# Patient Record
Sex: FEMALE | Race: WHITE | NOT HISPANIC OR LATINO | Employment: OTHER | URBAN - METROPOLITAN AREA
[De-identification: names, ages, dates, MRNs, and addresses within clinical notes are randomized per-mention and may not be internally consistent; named-entity substitution may affect disease eponyms.]

---

## 2019-01-03 ENCOUNTER — OFFICE VISIT (OUTPATIENT)
Dept: OBSTETRICS AND GYNECOLOGY | Facility: CLINIC | Age: 79
End: 2019-01-03
Payer: MEDICARE

## 2019-01-03 VITALS
WEIGHT: 237.63 LBS | HEIGHT: 61 IN | BODY MASS INDEX: 44.87 KG/M2 | DIASTOLIC BLOOD PRESSURE: 80 MMHG | SYSTOLIC BLOOD PRESSURE: 136 MMHG

## 2019-01-03 DIAGNOSIS — N81.4 CYSTOCELE WITH UTERINE PROLAPSE: Primary | ICD-10-CM

## 2019-01-03 PROCEDURE — 99203 OFFICE O/P NEW LOW 30 MIN: CPT | Mod: S$PBB,,, | Performed by: OBSTETRICS & GYNECOLOGY

## 2019-01-03 PROCEDURE — 99202 OFFICE O/P NEW SF 15 MIN: CPT | Mod: PBBFAC | Performed by: OBSTETRICS & GYNECOLOGY

## 2019-01-03 PROCEDURE — 99203 PR OFFICE/OUTPT VISIT, NEW, LEVL III, 30-44 MIN: ICD-10-PCS | Mod: S$PBB,,, | Performed by: OBSTETRICS & GYNECOLOGY

## 2019-01-03 PROCEDURE — 99999 PR PBB SHADOW E&M-NEW PATIENT-LVL II: ICD-10-PCS | Mod: PBBFAC,,, | Performed by: OBSTETRICS & GYNECOLOGY

## 2019-01-03 PROCEDURE — 99999 PR PBB SHADOW E&M-NEW PATIENT-LVL II: CPT | Mod: PBBFAC,,, | Performed by: OBSTETRICS & GYNECOLOGY

## 2019-01-03 RX ORDER — LOSARTAN POTASSIUM 50 MG/1
TABLET ORAL
Refills: 0 | COMMUNITY
Start: 2018-12-27

## 2019-01-03 RX ORDER — ASPIRIN 81 MG/1
81 TABLET ORAL DAILY
COMMUNITY

## 2019-01-03 RX ORDER — HYDROCHLOROTHIAZIDE 50 MG/1
TABLET ORAL
Refills: 5 | COMMUNITY
Start: 2018-12-17

## 2019-01-03 RX ORDER — TAMOXIFEN CITRATE 20 MG/1
TABLET ORAL
Refills: 6 | COMMUNITY
Start: 2018-12-26

## 2019-01-03 RX ORDER — MELOXICAM 15 MG/1
TABLET ORAL
Refills: 4 | COMMUNITY
Start: 2018-12-04

## 2019-01-03 RX ORDER — LEVOTHYROXINE SODIUM 150 UG/1
TABLET ORAL
Refills: 5 | COMMUNITY
Start: 2018-12-17

## 2019-01-03 RX ORDER — VERAPAMIL HYDROCHLORIDE 180 MG/1
TABLET, FILM COATED, EXTENDED RELEASE ORAL
Refills: 0 | COMMUNITY
Start: 2018-12-29

## 2019-01-03 NOTE — Clinical Note
Please fax clinic notes to Dr. Jillian Salcedo in [place], [place].  Please note on fax cover sheet that we are requesting preop clearance from Dr. Salcedo for this patient to undergo vaginal hysterectomy on January 18.

## 2019-01-04 ENCOUNTER — TELEPHONE (OUTPATIENT)
Dept: OBSTETRICS AND GYNECOLOGY | Facility: CLINIC | Age: 79
End: 2019-01-04

## 2019-01-04 NOTE — TELEPHONE ENCOUNTER
----- Message from Rosita Booker LPN sent at 1/4/2019  4:01 PM CST -----  Contact: pt   Hi!    Yesterday's visit is not charted yet, so I do not know what to do to help this patient.  Can y'all call her?    J  ----- Message -----  From: Josiah Harvey  Sent: 1/4/2019   3:48 PM  To: Aracelis Figueroa Staff    Pt is requesting a call back from the nurse in regards to the pt getting scheduled for her Procedure and to see if the Dr spoke to her PCP the pt did state  That she had other things to talk to the Dr about  [phone] (rpzr)

## 2019-01-04 NOTE — TELEPHONE ENCOUNTER
Spoke with patient. Advised patient Dr. Hsu is working on her procedure and will be in touch.  Patient verbalized understanding.

## 2019-01-07 ENCOUNTER — TELEPHONE (OUTPATIENT)
Dept: OBSTETRICS AND GYNECOLOGY | Facility: CLINIC | Age: 79
End: 2019-01-07

## 2019-01-07 DIAGNOSIS — N81.6 CYSTOCELE WITH RECTOCELE: ICD-10-CM

## 2019-01-07 DIAGNOSIS — N81.4 UTERINE PROLAPSE: ICD-10-CM

## 2019-01-07 DIAGNOSIS — Z01.818 PREOPERATIVE TESTING: Primary | ICD-10-CM

## 2019-01-07 DIAGNOSIS — N81.10 CYSTOCELE WITH RECTOCELE: ICD-10-CM

## 2019-01-10 PROBLEM — N81.4 CYSTOCELE WITH UTERINE PROLAPSE: Status: ACTIVE | Noted: 2019-01-10

## 2019-01-10 NOTE — PROGRESS NOTES
"HPI:  78 y.o. female  presents today with complaint of uterine prolapse.  She is currently wearing a gelhorn pessary, but states it comes out whenever she has a bowel movement.  Her  is able to replace it for her.  Without the pessary, her cervix protrudes through the introitus and causes pelvic pressure and pain.  She desires surgical treatment, rather than continuing to use a pessary.  She has nocturia 3-4 times per night.  No urinary incontinence, even with pessary.  History of breast cancer in past.  Using premarin vaginal cream for atrophy.  Patient lives in [place].  PCP is Dr. Jillian Salcedo, who manages her medical issues, which are hypertension and hypothyroidism.      REVIEW OF SYSTEMS:  GENERAL:  No fever, chills, fatigue, or weight loss  ABDOMEN:  Normal appetite, no weight loss or abdominal pain  URINARY:  No flank pain, dysuria, or hematuria  REPRODUCTIVE:  No abnormal vaginal bleeding  BREASTS:  No tenderness, masses, or nipple discharge noted of breasts    PHYSICAL EXAM:    APPEARANCE:  Well nourished, well developed, in no acute distress  ABDOMEN:  Soft, no tenderness or masses, no distension noted  PELVIC:  VULVA:  No lesions.  Normal female genitalia  URETHRAL MEATUS:  Normal size and location.  No lesions.  No prolapse  URETHRA:  No masses, tenderness, prolapse, or scarring  VAGINA:  No lesions or discharge.  Third degree cystocele, second degree rectocele  CERVIX:  No lesions.  Normal diameter, no cervical motion tenderness.  UTERUS:  4-6 week size, regular shape, mobile, non-tender, 4th degree prolapse noted, with cervix coming through introitus.  ADNEXA:  No masses or tenderness  ANUS AND PERINEUM:  No lesions.  No external hemorrhoids.    Pessary was removed prior to exam.  Patient fitted with a new 2 3/4" gelhorn pessary (prior one was 2" pessary), which seemed to provide good support and fit well.    ASSESSMENT:  1. Cystocele with uterine prolapse     2.      " Rectocele      PLAN:  Discussed treatment options with patient.  She desires definitive management with surgical correction.  Would recommend total vaginal hysterectomy with anterior and posterior repair, as well as sacrospinous ligament fixtion for cuff suspension.  Explained procedure to patient.  She desires to proceed with scheduling this.  Patient will need preop clearance for surgery and general anesthesia from her PCP, Dr. Salcedo.    Discussed with the patient and all questioned fully answered.

## 2019-01-11 ENCOUNTER — OFFICE VISIT (OUTPATIENT)
Dept: OBSTETRICS AND GYNECOLOGY | Facility: CLINIC | Age: 79
End: 2019-01-11
Payer: MEDICARE

## 2019-01-11 ENCOUNTER — CLINICAL SUPPORT (OUTPATIENT)
Dept: CARDIOLOGY | Facility: CLINIC | Age: 79
End: 2019-01-11
Payer: MEDICARE

## 2019-01-11 ENCOUNTER — HOSPITAL ENCOUNTER (OUTPATIENT)
Dept: PREADMISSION TESTING | Facility: HOSPITAL | Age: 79
Discharge: HOME | End: 2019-01-11
Attending: OBSTETRICS & GYNECOLOGY
Payer: MEDICARE

## 2019-01-11 ENCOUNTER — HOSPITAL ENCOUNTER (OUTPATIENT)
Dept: RADIOLOGY | Facility: HOSPITAL | Age: 79
Discharge: HOME | End: 2019-01-11
Attending: OBSTETRICS & GYNECOLOGY
Payer: MEDICARE

## 2019-01-11 VITALS
DIASTOLIC BLOOD PRESSURE: 78 MMHG | SYSTOLIC BLOOD PRESSURE: 122 MMHG | HEIGHT: 61 IN | WEIGHT: 236.56 LBS | BODY MASS INDEX: 44.66 KG/M2

## 2019-01-11 VITALS — BODY MASS INDEX: 44.4 KG/M2 | WEIGHT: 235 LBS

## 2019-01-11 DIAGNOSIS — Z01.818 PREOP EXAMINATION: Primary | ICD-10-CM

## 2019-01-11 DIAGNOSIS — Z01.818 PREOP EXAMINATION: ICD-10-CM

## 2019-01-11 DIAGNOSIS — Z01.818 PREOPERATIVE TESTING: ICD-10-CM

## 2019-01-11 PROCEDURE — 99999 PR PBB SHADOW E&M-EST. PATIENT-LVL III: ICD-10-PCS | Mod: PBBFAC,,, | Performed by: OBSTETRICS & GYNECOLOGY

## 2019-01-11 PROCEDURE — 71045 X-RAY EXAM CHEST 1 VIEW: CPT | Mod: 26,,, | Performed by: RADIOLOGY

## 2019-01-11 PROCEDURE — 99499 NO LOS: ICD-10-PCS | Mod: S$PBB,,, | Performed by: OBSTETRICS & GYNECOLOGY

## 2019-01-11 PROCEDURE — 99213 OFFICE O/P EST LOW 20 MIN: CPT | Mod: PBBFAC,25 | Performed by: OBSTETRICS & GYNECOLOGY

## 2019-01-11 PROCEDURE — 93005 ELECTROCARDIOGRAM TRACING: CPT | Mod: PBBFAC | Performed by: INTERNAL MEDICINE

## 2019-01-11 PROCEDURE — 93010 EKG 12-LEAD: ICD-10-PCS | Mod: S$PBB,,, | Performed by: INTERNAL MEDICINE

## 2019-01-11 PROCEDURE — 99499 UNLISTED E&M SERVICE: CPT | Mod: S$PBB,,, | Performed by: OBSTETRICS & GYNECOLOGY

## 2019-01-11 PROCEDURE — 93010 ELECTROCARDIOGRAM REPORT: CPT | Mod: S$PBB,,, | Performed by: INTERNAL MEDICINE

## 2019-01-11 PROCEDURE — 71045 X-RAY EXAM CHEST 1 VIEW: CPT | Mod: TC

## 2019-01-11 PROCEDURE — 71045 XR CHEST 1 VIEW PRE-OP: ICD-10-PCS | Mod: 26,,, | Performed by: RADIOLOGY

## 2019-01-11 PROCEDURE — 99999 PR PBB SHADOW E&M-EST. PATIENT-LVL III: CPT | Mod: PBBFAC,,, | Performed by: OBSTETRICS & GYNECOLOGY

## 2019-01-11 NOTE — DISCHARGE INSTRUCTIONS
To confirm, Your doctor has instructed you that surgery is scheduled for 1/18/19  at  07:00 am.       Please report to [facility], [address], 1st floor, main lobby by 05:30am.    Pre admit office will call afternoon prior to surgery with final arrival time    INSTRUCTIONS IMPORTANT!!!   Do not eat, drink, or smoke after 12 midnight-including water. OK to brush teeth, no gum, candy or mints!    ¨ Take only these medicines with a small swallow of water-morning of surgery.  Synthroid, Losartan, Verapamil  Hold Aspirin and Mobic as of today  Stop all vitamins and supplements      Pre operative instructions:  Please review the Pre-Operative Instruction booklet that you were given.        Bathing Instructions--See page 6 in the Pre-operative booklet.      Prevention of surgical site infections:     -Keep incisions clean and dry.   -Do not soak/submerge incisions in water until completely healed.   -Do not apply lotions, powders, creams, or deodorants to site.   -Always make sure hands are cleaned with antibacterial soap/ alcohol-based                 prior to touching the surgical site.  (This includes doctors,                 nurses, staff, and yourself.)    Signs and symptoms:   -Redness and pain around the area where you had surgery   -Drainage of cloudy fluid from your surgical wound   -Fever over 100.4       I have read or had read and explained to me, and understand the above information.  Additional comments or instructions:  Received a copy of Pre-operative instructions booklet, FAQ surgical site infection sheet, and packets of hibiclens (if indicated).

## 2019-01-12 NOTE — PROGRESS NOTES
Patient presents today for preoperative visit.  She is scheduled for TVH, A&P repair, and SSLF on 1/18/19.  Patient states the pessary placed last week has stayed in place well and has caused no issues.  Discussed with her the option of continuing with that pessary as an alternative to surgery.  However, patient doesn't want to have to deal with the recurring visits and care required with a pessary and desires to proceed with surgery.  Procedure was explained to the patient and procedure specific consent form reviewed with the patient and signed by her.  All questions were answered to the patient's satisfaction.  Patient seems to understand the procedure, as well as risks and benefits associated with it.  Will plan to proceed with surgery as planned.

## 2019-01-14 ENCOUNTER — TELEPHONE (OUTPATIENT)
Dept: OBSTETRICS AND GYNECOLOGY | Facility: HOSPITAL | Age: 79
End: 2019-01-14

## 2019-01-14 NOTE — TELEPHONE ENCOUNTER
Patient is scheduled for surgery on 1/18/19.  She needs preop clearance by her PCP, Dr. Jillian Salcedo, in [place].  Please fax patient's lab, EKG, and chest x-ray results to Dr. Salcedo.  Fax number is [phone]

## 2019-01-15 ENCOUNTER — TELEPHONE (OUTPATIENT)
Dept: OBSTETRICS AND GYNECOLOGY | Facility: CLINIC | Age: 79
End: 2019-01-15

## 2019-01-15 NOTE — TELEPHONE ENCOUNTER
Spoke to patient. Patient stated that PCP can do EKG. Advised patient that is fine and she does not need to come to EKG appointment here as long as PCP will fax us a copy of it tomorrow. Advised patient of office fax number. Patient verbalized understanding.     EKG was found per Judy ROSSI Patient will not need another EKG.

## 2019-01-15 NOTE — TELEPHONE ENCOUNTER
----- Message from Jackeline Mueller sent at 1/15/2019  4:41 PM CST -----  Contact: Pt   Pt called and stated she needs to know if she needs to have an order for a mouth swab test to check for staph infection. She can be reached at [phone].    Thanks,  TF

## 2019-01-15 NOTE — TELEPHONE ENCOUNTER
Advised  that a mouth swab is not required prior to surgery.  Patient is seeing PCP tomorrow and he will check with them to see if they require that test before approving surgery.

## 2019-01-15 NOTE — TELEPHONE ENCOUNTER
Spoke tot he pt.'s tristanand and gave him the message below from Judy. yancy Zhang    Spoke to patient. Patient stated that PCP can do EKG. Advised patient that is fine and she does not need to come to EKG appointment here as long as PCP will fax us a copy of it tomorrow. Advised patient of office fax number. Patient verbalized understanding.      EKG was found per Judy ROSSI Patient will not need another EKG.

## 2019-01-18 ENCOUNTER — ANESTHESIA EVENT (OUTPATIENT)
Dept: SURGERY | Facility: HOSPITAL | Age: 79
DRG: 743 | End: 2019-01-18
Payer: MEDICARE

## 2019-01-18 ENCOUNTER — HOSPITAL ENCOUNTER (INPATIENT)
Facility: HOSPITAL | Age: 79
LOS: 1 days | Discharge: HOME | DRG: 743 | End: 2019-01-19
Attending: OBSTETRICS & GYNECOLOGY | Admitting: OBSTETRICS & GYNECOLOGY
Payer: MEDICARE

## 2019-01-18 ENCOUNTER — ANESTHESIA (OUTPATIENT)
Dept: SURGERY | Facility: HOSPITAL | Age: 79
DRG: 743 | End: 2019-01-18
Payer: MEDICARE

## 2019-01-18 DIAGNOSIS — N81.4 CYSTOCELE WITH UTERINE PROLAPSE: ICD-10-CM

## 2019-01-18 DIAGNOSIS — Z90.710 S/P VAGINAL HYSTERECTOMY: Primary | ICD-10-CM

## 2019-01-18 PROBLEM — I10 ESSENTIAL HYPERTENSION: Status: ACTIVE | Noted: 2019-01-18

## 2019-01-18 PROBLEM — E03.9 HYPOTHYROIDISM (ACQUIRED): Status: ACTIVE | Noted: 2019-01-18

## 2019-01-18 PROBLEM — Z85.3 HISTORY OF BREAST CANCER: Status: ACTIVE | Noted: 2019-01-18

## 2019-01-18 LAB — POCT GLUCOSE: 127 MG/DL (ref 70–110)

## 2019-01-18 PROCEDURE — 36000708 HC OR TIME LEV III 1ST 15 MIN: Performed by: OBSTETRICS & GYNECOLOGY

## 2019-01-18 PROCEDURE — 58260 PR VAGINAL HYSTERECTOMY,UTERUS 250 GMS/<: ICD-10-PCS | Mod: 80,51,, | Performed by: OBSTETRICS & GYNECOLOGY

## 2019-01-18 PROCEDURE — 97116 GAIT TRAINING THERAPY: CPT

## 2019-01-18 PROCEDURE — 57282 COLPOPEXY EXTRAPERITONEAL: CPT | Mod: 80,59,, | Performed by: OBSTETRICS & GYNECOLOGY

## 2019-01-18 PROCEDURE — C2631 REP DEV, URINARY, W/O SLING: HCPCS | Performed by: OBSTETRICS & GYNECOLOGY

## 2019-01-18 PROCEDURE — 88305 TISSUE SPECIMEN TO PATHOLOGY - SURGERY: ICD-10-PCS | Mod: 26,,, | Performed by: PATHOLOGY

## 2019-01-18 PROCEDURE — 97161 PT EVAL LOW COMPLEX 20 MIN: CPT

## 2019-01-18 PROCEDURE — 37000008 HC ANESTHESIA 1ST 15 MINUTES: Performed by: OBSTETRICS & GYNECOLOGY

## 2019-01-18 PROCEDURE — 25000003 PHARM REV CODE 250: Performed by: OBSTETRICS & GYNECOLOGY

## 2019-01-18 PROCEDURE — 37000009 HC ANESTHESIA EA ADD 15 MINS: Performed by: OBSTETRICS & GYNECOLOGY

## 2019-01-18 PROCEDURE — 57282 PR REVAGINAL PROLAPSE,SACROSP LIG: ICD-10-PCS | Mod: 59,,, | Performed by: OBSTETRICS & GYNECOLOGY

## 2019-01-18 PROCEDURE — 57282 COLPOPEXY EXTRAPERITONEAL: CPT | Mod: 59,,, | Performed by: OBSTETRICS & GYNECOLOGY

## 2019-01-18 PROCEDURE — 88305 TISSUE EXAM BY PATHOLOGIST: CPT | Mod: 26,,, | Performed by: PATHOLOGY

## 2019-01-18 PROCEDURE — 25000003 PHARM REV CODE 250: Performed by: NURSE ANESTHETIST, CERTIFIED REGISTERED

## 2019-01-18 PROCEDURE — 88305 TISSUE EXAM BY PATHOLOGIST: CPT | Performed by: PATHOLOGY

## 2019-01-18 PROCEDURE — 57250 PR POST COLPORRHAPHY,RECTUM/VAGINA: ICD-10-PCS | Mod: ,,, | Performed by: OBSTETRICS & GYNECOLOGY

## 2019-01-18 PROCEDURE — 58260 PR VAGINAL HYSTERECTOMY,UTERUS 250 GMS/<: ICD-10-PCS | Mod: 51,,, | Performed by: OBSTETRICS & GYNECOLOGY

## 2019-01-18 PROCEDURE — 36000709 HC OR TIME LEV III EA ADD 15 MIN: Performed by: OBSTETRICS & GYNECOLOGY

## 2019-01-18 PROCEDURE — 11000001 HC ACUTE MED/SURG PRIVATE ROOM

## 2019-01-18 PROCEDURE — 57282 PR REVAGINAL PROLAPSE,SACROSP LIG: ICD-10-PCS | Mod: 80,59,, | Performed by: OBSTETRICS & GYNECOLOGY

## 2019-01-18 PROCEDURE — 57250 REPAIR RECTUM & VAGINA: CPT | Mod: 80,,, | Performed by: OBSTETRICS & GYNECOLOGY

## 2019-01-18 PROCEDURE — 63600175 PHARM REV CODE 636 W HCPCS: Performed by: OBSTETRICS & GYNECOLOGY

## 2019-01-18 PROCEDURE — 58260 VAGINAL HYSTERECTOMY: CPT | Mod: 51,,, | Performed by: OBSTETRICS & GYNECOLOGY

## 2019-01-18 PROCEDURE — 58260 VAGINAL HYSTERECTOMY: CPT | Mod: 80,51,, | Performed by: OBSTETRICS & GYNECOLOGY

## 2019-01-18 PROCEDURE — 27000221 HC OXYGEN, UP TO 24 HOURS

## 2019-01-18 PROCEDURE — 63600175 PHARM REV CODE 636 W HCPCS: Performed by: ANESTHESIOLOGY

## 2019-01-18 PROCEDURE — 57250 REPAIR RECTUM & VAGINA: CPT | Mod: ,,, | Performed by: OBSTETRICS & GYNECOLOGY

## 2019-01-18 PROCEDURE — 71000033 HC RECOVERY, INTIAL HOUR: Performed by: OBSTETRICS & GYNECOLOGY

## 2019-01-18 PROCEDURE — 57250 PR POST COLPORRHAPHY,RECTUM/VAGINA: ICD-10-PCS | Mod: 80,,, | Performed by: OBSTETRICS & GYNECOLOGY

## 2019-01-18 PROCEDURE — 94799 UNLISTED PULMONARY SVC/PX: CPT

## 2019-01-18 PROCEDURE — 63600175 PHARM REV CODE 636 W HCPCS: Performed by: NURSE ANESTHETIST, CERTIFIED REGISTERED

## 2019-01-18 PROCEDURE — 94761 N-INVAS EAR/PLS OXIMETRY MLT: CPT

## 2019-01-18 RX ORDER — LOSARTAN POTASSIUM 50 MG/1
50 TABLET ORAL DAILY
Status: DISCONTINUED | OUTPATIENT
Start: 2019-01-19 | End: 2019-01-19 | Stop reason: HOSPADM

## 2019-01-18 RX ORDER — VERAPAMIL HYDROCHLORIDE 180 MG/1
180 TABLET, FILM COATED, EXTENDED RELEASE ORAL DAILY
Status: DISCONTINUED | OUTPATIENT
Start: 2019-01-19 | End: 2019-01-19

## 2019-01-18 RX ORDER — DIPHENHYDRAMINE HYDROCHLORIDE 50 MG/ML
25 INJECTION INTRAMUSCULAR; INTRAVENOUS EVERY 6 HOURS PRN
Status: DISCONTINUED | OUTPATIENT
Start: 2019-01-18 | End: 2019-01-18

## 2019-01-18 RX ORDER — CEFAZOLIN SODIUM 1 G/3ML
INJECTION, POWDER, FOR SOLUTION INTRAMUSCULAR; INTRAVENOUS
Status: DISCONTINUED | OUTPATIENT
Start: 2019-01-18 | End: 2019-01-18

## 2019-01-18 RX ORDER — HYDROMORPHONE HYDROCHLORIDE 2 MG/ML
0.2 INJECTION, SOLUTION INTRAMUSCULAR; INTRAVENOUS; SUBCUTANEOUS EVERY 5 MIN PRN
Status: DISCONTINUED | OUTPATIENT
Start: 2019-01-18 | End: 2019-01-18 | Stop reason: HOSPADM

## 2019-01-18 RX ORDER — IBUPROFEN 400 MG/1
800 TABLET ORAL EVERY 8 HOURS
Status: DISCONTINUED | OUTPATIENT
Start: 2019-01-19 | End: 2019-01-19 | Stop reason: HOSPADM

## 2019-01-18 RX ORDER — ONDANSETRON 2 MG/ML
INJECTION INTRAMUSCULAR; INTRAVENOUS
Status: DISCONTINUED | OUTPATIENT
Start: 2019-01-18 | End: 2019-01-18

## 2019-01-18 RX ORDER — LIDOCAINE HYDROCHLORIDE 10 MG/ML
INJECTION INFILTRATION; PERINEURAL
Status: DISCONTINUED | OUTPATIENT
Start: 2019-01-18 | End: 2019-01-18

## 2019-01-18 RX ORDER — ROCURONIUM BROMIDE 10 MG/ML
INJECTION, SOLUTION INTRAVENOUS
Status: DISCONTINUED | OUTPATIENT
Start: 2019-01-18 | End: 2019-01-18

## 2019-01-18 RX ORDER — MEPERIDINE HYDROCHLORIDE 50 MG/ML
12.5 INJECTION INTRAMUSCULAR; INTRAVENOUS; SUBCUTANEOUS ONCE AS NEEDED
Status: DISCONTINUED | OUTPATIENT
Start: 2019-01-18 | End: 2019-01-18 | Stop reason: HOSPADM

## 2019-01-18 RX ORDER — SIMETHICONE 80 MG
80 TABLET,CHEWABLE ORAL EVERY 4 HOURS PRN
Status: DISCONTINUED | OUTPATIENT
Start: 2019-01-18 | End: 2019-01-19 | Stop reason: HOSPADM

## 2019-01-18 RX ORDER — DIPHENHYDRAMINE HCL 25 MG
25 CAPSULE ORAL EVERY 4 HOURS PRN
Status: DISCONTINUED | OUTPATIENT
Start: 2019-01-18 | End: 2019-01-19 | Stop reason: HOSPADM

## 2019-01-18 RX ORDER — ETOMIDATE 2 MG/ML
INJECTION INTRAVENOUS
Status: DISCONTINUED | OUTPATIENT
Start: 2019-01-18 | End: 2019-01-18

## 2019-01-18 RX ORDER — SUCCINYLCHOLINE CHLORIDE 20 MG/ML
INJECTION INTRAMUSCULAR; INTRAVENOUS
Status: DISCONTINUED | OUTPATIENT
Start: 2019-01-18 | End: 2019-01-18

## 2019-01-18 RX ORDER — VASOPRESSIN 20 [USP'U]/ML
INJECTION, SOLUTION INTRAMUSCULAR; SUBCUTANEOUS
Status: DISCONTINUED | OUTPATIENT
Start: 2019-01-18 | End: 2019-01-18 | Stop reason: HOSPADM

## 2019-01-18 RX ORDER — KETOROLAC TROMETHAMINE 30 MG/ML
INJECTION, SOLUTION INTRAMUSCULAR; INTRAVENOUS
Status: DISCONTINUED | OUTPATIENT
Start: 2019-01-18 | End: 2019-01-18

## 2019-01-18 RX ORDER — OXYCODONE AND ACETAMINOPHEN 5; 325 MG/1; MG/1
1 TABLET ORAL EVERY 4 HOURS PRN
Status: DISCONTINUED | OUTPATIENT
Start: 2019-01-18 | End: 2019-01-19

## 2019-01-18 RX ORDER — SODIUM CHLORIDE, SODIUM LACTATE, POTASSIUM CHLORIDE, CALCIUM CHLORIDE 600; 310; 30; 20 MG/100ML; MG/100ML; MG/100ML; MG/100ML
INJECTION, SOLUTION INTRAVENOUS CONTINUOUS
Status: DISCONTINUED | OUTPATIENT
Start: 2019-01-18 | End: 2019-01-18

## 2019-01-18 RX ORDER — NEOSTIGMINE METHYLSULFATE 1 MG/ML
INJECTION, SOLUTION INTRAVENOUS
Status: DISCONTINUED | OUTPATIENT
Start: 2019-01-18 | End: 2019-01-18

## 2019-01-18 RX ORDER — PHENAZOPYRIDINE HYDROCHLORIDE 100 MG/1
200 TABLET, FILM COATED ORAL
Status: DISCONTINUED | OUTPATIENT
Start: 2019-01-18 | End: 2019-01-18 | Stop reason: HOSPADM

## 2019-01-18 RX ORDER — ONDANSETRON 2 MG/ML
4 INJECTION INTRAMUSCULAR; INTRAVENOUS DAILY PRN
Status: DISCONTINUED | OUTPATIENT
Start: 2019-01-18 | End: 2019-01-18 | Stop reason: HOSPADM

## 2019-01-18 RX ORDER — FENTANYL CITRATE 50 UG/ML
25 INJECTION, SOLUTION INTRAMUSCULAR; INTRAVENOUS EVERY 5 MIN PRN
Status: DISCONTINUED | OUTPATIENT
Start: 2019-01-18 | End: 2019-01-18 | Stop reason: HOSPADM

## 2019-01-18 RX ORDER — HYDROCHLOROTHIAZIDE 25 MG/1
50 TABLET ORAL DAILY
Status: DISCONTINUED | OUTPATIENT
Start: 2019-01-19 | End: 2019-01-19 | Stop reason: HOSPADM

## 2019-01-18 RX ORDER — ONDANSETRON 8 MG/1
8 TABLET, ORALLY DISINTEGRATING ORAL EVERY 8 HOURS PRN
Status: DISCONTINUED | OUTPATIENT
Start: 2019-01-18 | End: 2019-01-19 | Stop reason: HOSPADM

## 2019-01-18 RX ORDER — HYDROMORPHONE HYDROCHLORIDE 1 MG/ML
1 INJECTION, SOLUTION INTRAMUSCULAR; INTRAVENOUS; SUBCUTANEOUS EVERY 4 HOURS PRN
Status: DISCONTINUED | OUTPATIENT
Start: 2019-01-18 | End: 2019-01-19 | Stop reason: HOSPADM

## 2019-01-18 RX ORDER — OXYCODONE AND ACETAMINOPHEN 10; 325 MG/1; MG/1
1 TABLET ORAL EVERY 4 HOURS PRN
Status: DISCONTINUED | OUTPATIENT
Start: 2019-01-18 | End: 2019-01-19

## 2019-01-18 RX ORDER — ASPIRIN 81 MG/1
81 TABLET ORAL DAILY
Status: DISCONTINUED | OUTPATIENT
Start: 2019-01-18 | End: 2019-01-19 | Stop reason: HOSPADM

## 2019-01-18 RX ORDER — KETOROLAC TROMETHAMINE 30 MG/ML
15 INJECTION, SOLUTION INTRAMUSCULAR; INTRAVENOUS EVERY 6 HOURS
Status: COMPLETED | OUTPATIENT
Start: 2019-01-18 | End: 2019-01-19

## 2019-01-18 RX ORDER — HYDROCODONE BITARTRATE AND ACETAMINOPHEN 5; 325 MG/1; MG/1
1 TABLET ORAL
Status: DISCONTINUED | OUTPATIENT
Start: 2019-01-18 | End: 2019-01-18 | Stop reason: HOSPADM

## 2019-01-18 RX ORDER — PROPOFOL 10 MG/ML
VIAL (ML) INTRAVENOUS
Status: DISCONTINUED | OUTPATIENT
Start: 2019-01-18 | End: 2019-01-18

## 2019-01-18 RX ORDER — SODIUM CHLORIDE, SODIUM LACTATE, POTASSIUM CHLORIDE, CALCIUM CHLORIDE 600; 310; 30; 20 MG/100ML; MG/100ML; MG/100ML; MG/100ML
INJECTION, SOLUTION INTRAVENOUS CONTINUOUS PRN
Status: DISCONTINUED | OUTPATIENT
Start: 2019-01-18 | End: 2019-01-18

## 2019-01-18 RX ORDER — FENTANYL CITRATE 50 UG/ML
INJECTION, SOLUTION INTRAMUSCULAR; INTRAVENOUS
Status: DISCONTINUED | OUTPATIENT
Start: 2019-01-18 | End: 2019-01-18

## 2019-01-18 RX ORDER — SODIUM CHLORIDE, SODIUM LACTATE, POTASSIUM CHLORIDE, CALCIUM CHLORIDE 600; 310; 30; 20 MG/100ML; MG/100ML; MG/100ML; MG/100ML
INJECTION, SOLUTION INTRAVENOUS CONTINUOUS
Status: DISCONTINUED | OUTPATIENT
Start: 2019-01-18 | End: 2019-01-19

## 2019-01-18 RX ORDER — LEVOTHYROXINE SODIUM 150 UG/1
150 TABLET ORAL
Status: DISCONTINUED | OUTPATIENT
Start: 2019-01-19 | End: 2019-01-19 | Stop reason: HOSPADM

## 2019-01-18 RX ORDER — GLYCOPYRROLATE 0.2 MG/ML
INJECTION INTRAMUSCULAR; INTRAVENOUS
Status: DISCONTINUED | OUTPATIENT
Start: 2019-01-18 | End: 2019-01-18

## 2019-01-18 RX ADMIN — HYDROMORPHONE HYDROCHLORIDE 0.2 MG: 2 INJECTION INTRAMUSCULAR; INTRAVENOUS; SUBCUTANEOUS at 09:01

## 2019-01-18 RX ADMIN — SUCCINYLCHOLINE CHLORIDE 100 MG: 20 INJECTION, SOLUTION INTRAMUSCULAR; INTRAVENOUS at 07:01

## 2019-01-18 RX ADMIN — SODIUM CHLORIDE, SODIUM LACTATE, POTASSIUM CHLORIDE, AND CALCIUM CHLORIDE: .6; .31; .03; .02 INJECTION, SOLUTION INTRAVENOUS at 08:01

## 2019-01-18 RX ADMIN — ONDANSETRON 4 MG: 2 INJECTION INTRAMUSCULAR; INTRAVENOUS at 09:01

## 2019-01-18 RX ADMIN — LIDOCAINE HYDROCHLORIDE 40 MG: 10 INJECTION, SOLUTION INFILTRATION; PERINEURAL at 07:01

## 2019-01-18 RX ADMIN — SODIUM CHLORIDE, SODIUM LACTATE, POTASSIUM CHLORIDE, AND CALCIUM CHLORIDE: .6; .31; .03; .02 INJECTION, SOLUTION INTRAVENOUS at 11:01

## 2019-01-18 RX ADMIN — NEOSTIGMINE METHYLSULFATE 1 MG: 1 INJECTION INTRAVENOUS at 08:01

## 2019-01-18 RX ADMIN — FENTANYL CITRATE 50 MCG: 50 INJECTION, SOLUTION INTRAMUSCULAR; INTRAVENOUS at 07:01

## 2019-01-18 RX ADMIN — KETOROLAC TROMETHAMINE 15 MG: 30 INJECTION, SOLUTION INTRAMUSCULAR; INTRAVENOUS at 05:01

## 2019-01-18 RX ADMIN — KETOROLAC TROMETHAMINE 15 MG: 30 INJECTION, SOLUTION INTRAMUSCULAR; INTRAVENOUS at 01:01

## 2019-01-18 RX ADMIN — KETOROLAC TROMETHAMINE 15 MG: 30 INJECTION, SOLUTION INTRAMUSCULAR; INTRAVENOUS at 11:01

## 2019-01-18 RX ADMIN — ROCURONIUM BROMIDE 25 MG: 10 INJECTION, SOLUTION INTRAVENOUS at 07:01

## 2019-01-18 RX ADMIN — ROBINUL 0.2 MG: 0.2 INJECTION INTRAMUSCULAR; INTRAVENOUS at 08:01

## 2019-01-18 RX ADMIN — ROCURONIUM BROMIDE 5 MG: 10 INJECTION, SOLUTION INTRAVENOUS at 07:01

## 2019-01-18 RX ADMIN — ETOMIDATE 18 MG: 2 INJECTION, SOLUTION INTRAVENOUS at 07:01

## 2019-01-18 RX ADMIN — KETOROLAC TROMETHAMINE 15 MG: 30 INJECTION, SOLUTION INTRAMUSCULAR; INTRAVENOUS at 08:01

## 2019-01-18 RX ADMIN — CEFAZOLIN 2 G: 1 INJECTION, POWDER, FOR SOLUTION INTRAMUSCULAR; INTRAVENOUS at 07:01

## 2019-01-18 RX ADMIN — ONDANSETRON 4 MG: 2 INJECTION, SOLUTION INTRAMUSCULAR; INTRAVENOUS at 07:01

## 2019-01-18 RX ADMIN — ASPIRIN 81 MG: 81 TABLET, COATED ORAL at 11:01

## 2019-01-18 RX ADMIN — PROPOFOL 50 MG: 10 INJECTION, EMULSION INTRAVENOUS at 07:01

## 2019-01-18 RX ADMIN — SODIUM CHLORIDE, SODIUM LACTATE, POTASSIUM CHLORIDE, AND CALCIUM CHLORIDE: 600; 310; 30; 20 INJECTION, SOLUTION INTRAVENOUS at 07:01

## 2019-01-18 RX ADMIN — FENTANYL CITRATE 50 MCG: 50 INJECTION, SOLUTION INTRAMUSCULAR; INTRAVENOUS at 08:01

## 2019-01-18 NOTE — ANESTHESIA POSTPROCEDURE EVALUATION
"Anesthesia Post Evaluation    Patient: Shirin Cantu    Procedure(s) Performed: Procedure(s) (LRB):  HYSTERECTOMY, TOTAL, VAGINAL (N/A)  COLPORRHAPHY, POSTERIOR    Final Anesthesia Type: general  Patient location during evaluation: PACU  Patient participation: Yes- Able to Participate  Level of consciousness: awake and alert  Post-procedure vital signs: reviewed and stable  Pain management: adequate  Airway patency: patent  PONV status at discharge: No PONV  Anesthetic complications: no      Cardiovascular status: blood pressure returned to baseline  Respiratory status: unassisted and spontaneous ventilation  Hydration status: euvolemic  Follow-up not needed.        Visit Vitals  /60 (BP Location: Right arm, Patient Position: Lying)   Pulse 65   Temp 36.9 °C (98.5 °F) (Oral)   Resp 17   Ht 5' 2" (1.575 m)   Wt 104.8 kg (231 lb)   SpO2 (!) 92%   Breastfeeding? No   BMI 42.25 kg/m²       Pain/Amara Score: Pain Rating Prior to Med Admin: 5 (1/18/2019  9:47 AM)  Pain Rating Post Med Admin: 5 (1/18/2019 10:08 AM)  Amara Score: 9 (1/18/2019  9:45 AM)        "

## 2019-01-18 NOTE — HPI
78 y.o. female  with symptomatic uterine prolapse.  Also with difficulty emptying bladder.  No urinary leakage.  Has been wearing pessary for past month.  Patient desires TVH, A&P repair, with SSLF.

## 2019-01-18 NOTE — PT/OT/SLP EVAL
Physical Therapy Evaluation    Patient Name:  Shirin Cantu   MRN:  [id number]    Recommendations:     Discharge Recommendations:  other (see comments)(HOME HEALTH P.T.)   Discharge Equipment Recommendations: none   Barriers to discharge: None    Assessment:     Shirin Cantu is a 78 y.o. female admitted with a medical diagnosis of <principal problem not specified>.  She presents with the following impairments/functional limitations:  weakness, impaired endurance, impaired functional mobilty, impaired balance, gait instability, impaired self care skills .    Rehab Prognosis: Good; patient would benefit from acute skilled PT services to address these deficits and reach maximum level of function.    Recent Surgery: Procedure(s) (LRB):  HYSTERECTOMY, TOTAL, VAGINAL (N/A)  COLPORRHAPHY, POSTERIOR Day of Surgery    Plan:     During this hospitalization, patient to be seen   to address the identified rehab impairments via gait training, therapeutic activities, therapeutic exercises and progress toward the following goals:    · Plan of Care Expires:  01/25/19    Subjective     Chief Complaint: MERINO CATHETER  Patient/Family Comments/goals: GO HOME  Pain/Comfort:  · Pain Rating 1: 0/10  · Pain Rating Post-Intervention 1: 0/10    Patients cultural, spiritual, Quaker conflicts given the current situation:      Living Environment:  PT LIVES AT HOME WITH  AND HAS 6 STEPS WITH RAILING TO HOLD ONTO. PT LIVES IN A ONE STORY HOME   Prior to admission, patients level of function was MOD I WITH RW IN COMMUNITY AND AMBULATES IND INSIDE HOME.  Equipment used at home: walker, rolling.  DME owned (not currently used): none.  Upon discharge, patient will have assistance from .    Objective:     Communicated with NURSE WILKES AND Epic CHART REVIEW prior to session.  Patient found SUP IN BED WITH  PRESENT peripheral IV, merino catheter  upon PT entry to room.    General Precautions: Standard, fall   Orthopedic  Precautions:N/A   Braces: N/A     Exams:  · RLE ROM: WFL  · RLE Strength: WFL  · LLE ROM: WFL  · LLE Strength: WFL    Functional Mobility:  PT MET IN RM SUP.SIT EOB WITH MIN A. PT SCOOTED TO EOB WITH MIN A. PT STOOD WITH RW AND GT TRAINED X 150' WITH RW AND CGA. PT RETURNED TO RM SEATED EOB AND SUP IN BED WITH CGA. PT LEFT SUP IN BED AND EDUCATED ON ROLE OF P.T. PT LEFT WITH ALL NEEDS MET    AM-PAC 6 CLICK MOBILITY  Total Score:16     Patient left supine with call button in reach.    GOALS:   Multidisciplinary Problems     Physical Therapy Goals        Problem: Physical Therapy Goal    Goal Priority Disciplines Outcome Goal Variances Interventions   Physical Therapy Goal     PT, PT/OT      Description:  PT WILL BE SEEN FOR P.T. FOR A MIN OF 5 OUT OF 7 DAYS A WEEK  LT19  1. PT WILL COMPLETE BED MOBILITY MOD I  2. PT WILL T/F TO CHAIR WITH RW MOD I  3. PT WILL GT TRAIN X 250' MOD I WITH RW                      History:     Past Medical History:   Diagnosis Date    Breast cancer     Hypertension     Thyroid disease     s/p radioactive iodine treatment, now with hypothyroidism       Past Surgical History:   Procedure Laterality Date    BREAST LUMPECTOMY Left     SHOULDER SURGERY Right     TONSILLECTOMY, ADENOIDECTOMY      TOTAL KNEE ARTHROPLASTY Right        Clinical Decision Making:     History  Co-morbidities and personal factors that may impact the plan of care Examination  Body Structures and Functions, activity limitations and participation restrictions that may impact the plan of care Clinical Presentation   Decision Making/ Complexity Score   Co-morbidities:   [] Time since onset of injury / illness / exacerbation  [] Status of current condition  []Patient's cognitive status and safety concerns    [] Multiple Medical Problems (see med hx)  Personal Factors:   [] Patient's age  [] Prior Level of function   [] Patient's home situation (environment and family support)  [] Patient's level of  motivation  [] Expected progression of patient      HISTORY:(criteria)    [] 17983 - no personal factors/history    [] 92781 - has 1-2 personal factor/comorbidity     [] 63823 - has >3 personal factor/comorbidity     Body Regions:  [] Objective examination findings  [] Head     []  Neck  [] Trunk   [] Upper Extremity  [] Lower Extremity    Body Systems:  [] For communication ability, affect, cognition, language, and learning style: the assessment of the ability to make needs known, consciousness, orientation (person, place, and time), expected emotional /behavioral responses, and learning preferences (eg, learning barriers, education  needs)  [] For the neuromuscular system: a general assessment of gross coordinated movement (eg, balance, gait, locomotion, transfers, and transitions) and motor function  (motor control and motor learning)  [] For the musculoskeletal system: the assessment of gross symmetry, gross range of motion, gross strength, height, and weight  [] For the integumentary system: the assessment of pliability(texture), presence of scar formation, skin color, and skin integrity  [] For cardiovascular/pulmonary system: the assessment of heart rate, respiratory rate, blood pressure, and edema     Activity limitations:    [] Patient's cognitive status and saf ety concerns          [] Status of current condition      [] Weight bearing restriction  [] Cardiopulmunary Restriction    Participation Restrictions:   [] Goals and goal agreement with the patient     [] Rehab potential (prognosis) and probable outcome      Examination of Body System: (criteria)    [] 41327 - addressing 1-2 elements    [] 83490 - addressing a total of 3 or more elements     [] 08872 -  Addressing a total of 4 or more elements         Clinical Presentation: (criteria)  Choose one     On examination of body system using standardized tests and measures patient presents with (CHOOSE ONE) elements from any of the following: body  structures and functions, activity limitations, and/or participation restrictions.  Leading to a clinical presentation that is considered (CHOOSE ONE)                              Clinical Decision Making  (Eval Complexity):  Choose One     Time Tracking:     PT Received On: 01/18/19  PT Start Time: 1415     PT Stop Time: 1440  PT Total Time (min): 25 min     Billable Minutes: Evaluation 15 and Gait Training 10      Selena Harmon, PT  01/18/2019

## 2019-01-18 NOTE — TRANSFER OF CARE
"Anesthesia Transfer of Care Note    Patient: Shirin Cantu    Procedure(s) Performed: Procedure(s) (LRB):  HYSTERECTOMY, TOTAL, VAGINAL (N/A)  COLPORRHAPHY, POSTERIOR    Patient location: PACU    Anesthesia Type: general    Transport from OR: Transported from OR on room air with adequate spontaneous ventilation    Post pain: adequate analgesia    Post assessment: no apparent anesthetic complications and tolerated procedure well    Post vital signs: stable    Level of consciousness: awake    Nausea/Vomiting: no nausea/vomiting    Complications: none    Transfer of care protocol was followed      Last vitals:   Visit Vitals  BP (!) 147/56 (BP Location: Right arm, Patient Position: Sitting)   Pulse 82   Temp 37.1 °C (98.8 °F) (Temporal)   Resp 18   Ht 5' 2" (1.575 m)   Wt 104.8 kg (231 lb)   SpO2 (!) 94%   Breastfeeding? No   BMI 42.25 kg/m²     "

## 2019-01-18 NOTE — H&P
[facility] -   Obstetrics & Gynecology  History & Physical    Patient Name: Shirin Cantu  MRN: [id number]  Admission Date: 2019  Primary Care Provider: To Obtain Unable    Subjective:     Chief Complaint/Reason for Admission: Uterine prolapse    History of Present Illness:  78 y.o. female  with symptomatic uterine prolapse.  Also with difficulty emptying bladder.  No urinary leakage.  Has been wearing pessary for past month.  Patient desires TVH, A&P repair, with SSLF.          Obstetric History       T3      L3     SAB0   TAB0   Ectopic0   Multiple0   Live Births3       # Outcome Date GA Lbr Yuri/2nd Weight Sex Delivery Anes PTL Lv   3 Term         ABBI   2 Term         ABBI   1 Term         ABBI        Past Medical History:   Diagnosis Date    Breast cancer     Hypertension     Thyroid disease     s/p radioactive iodine treatment, now with hypothyroidism     Past Surgical History:   Procedure Laterality Date    BREAST LUMPECTOMY Left     SHOULDER SURGERY Right     TONSILLECTOMY, ADENOIDECTOMY      TOTAL KNEE ARTHROPLASTY Right        PTA Medications   Medication Sig    aspirin (ECOTRIN) 81 MG EC tablet Take 81 mg by mouth once daily.    hydroCHLOROthiazide (HYDRODIURIL) 50 MG tablet     levothyroxine (SYNTHROID) 150 MCG tablet     losartan (COZAAR) 50 MG tablet     meloxicam (MOBIC) 15 MG tablet     tamoxifen (NOLVADEX) 20 MG Tab     verapamil (CALAN-SR) 180 MG CR tablet        Review of patient's allergies indicates:   Allergen Reactions    Codeine Nausea And Vomiting    Penicillins Rash        Family History     Problem Relation (Age of Onset)    Breast cancer Father, Sister    Hypertension Mother        Tobacco Use    Smoking status: Never Smoker    Smokeless tobacco: Never Used   Substance and Sexual Activity    Alcohol use: Yes     Comment: Daily bloody stephy/wine    Drug use: No    Sexual activity: Not Currently     Review of Systems   Constitutional:  Negative for chills and fever.   Respiratory: Negative for cough and shortness of breath.    Cardiovascular: Negative for chest pain.   Gastrointestinal: Negative for abdominal pain, nausea and vomiting.   Genitourinary: Negative for dysuria, menorrhagia, pelvic pain and vaginal bleeding.      Objective:     Vital Signs (Most Recent):    Vital Signs (24h Range):           There is no height or weight on file to calculate BMI.    No LMP recorded. Patient is postmenopausal.    Physical Exam:   Constitutional: She is oriented to person, place, and time. She appears well-developed and well-nourished. No distress.    HENT:   Head: Normocephalic and atraumatic.     Neck: Neck supple.    Cardiovascular: Normal rate and regular rhythm.     Pulmonary/Chest: Effort normal.        Abdominal: Soft. She exhibits no distension. There is no tenderness.     Genitourinary:   Genitourinary Comments: 4th degree uterine prolapse, 3rd degree cystocele, 2nd degree rectocele.           Musculoskeletal: She exhibits no edema or tenderness.       Neurological: She is alert and oriented to person, place, and time.    Skin: Skin is warm and dry.    Psychiatric: She has a normal mood and affect.       Laboratory:  I have personallly reviewed all pertinent lab results from the last 24 hours.    Diagnostic Results:  EKG and CXR reviewed.    Assessment/Plan:     Cystocele with uterine prolapse    Proceed with TVH, A&P repair, and SSLF, as planned.  Patient desires removal of ovaries only if easily accessible by vaginal route.  Patient medically cleared for surgery by her PCP, Dr. Jillian Salcedo.         Mckayla Hsu MD  Obstetrics & Gynecology  [facility] -

## 2019-01-18 NOTE — ANESTHESIA PREPROCEDURE EVALUATION
01/18/2019  Shirin Cantu is a 78 y.o., female.    Anesthesia Evaluation    I have reviewed the Patient Summary Reports.    I have reviewed the Nursing Notes.   I have reviewed the Medications.     Review of Systems  Anesthesia Hx:  No problems with previous Anesthesia  Denies Family Hx of Anesthesia complications.   Denies Personal Hx of Anesthesia complications.   Social:  Non-Smoker    Cardiovascular:   Hypertension    Pulmonary:  Pulmonary Normal  Denies COPD.  Denies Asthma.    Renal/:  Renal/ Normal  Denies Chronic Renal Disease.     Hepatic/GI:  Hepatic/GI Normal  Denies GERD.    Neurological:   Denies CVA. Denies Seizures.    Endocrine:  Endocrine Normal Denies Diabetes. Denies Hypothyroidism.        Physical Exam   Airway/Jaw/Neck:  Airway Findings: Mouth Opening: Normal Tongue: Normal  General Airway Assessment: Adult  Mallampati: II  TM Distance: Normal, at least 6 cm  Jaw/Neck Findings:  Neck ROM: Normal ROM       Chest/Lungs:  Chest/Lungs Findings: Clear to auscultation, Normal Respiratory Rate     Heart/Vascular:  Heart Findings: Rate: Normal  Rhythm: Regular Rhythm             Anesthesia Plan  Type of Anesthesia, risks & benefits discussed:  Anesthesia Type:  general  Patient's Preference:   Intra-op Monitoring Plan:   Intra-op Monitoring Plan Comments:   Post Op Pain Control Plan:   Post Op Pain Control Plan Comments:   Induction:   IV  Beta Blocker:  Patient is not currently on a Beta-Blocker (No further documentation required).       Informed Consent: Patient understands risks and agrees with Anesthesia plan.  Questions answered.   ASA Score: 2     Day of Surgery Review of History & Physical: I have interviewed and examined the patient. I have reviewed the patient's H&P dated:  There are no significant changes.  H&P update referred to the surgeon.         Ready For Surgery From  Anesthesia Perspective.

## 2019-01-18 NOTE — OP NOTE
OPERATIVE NOTE      PREOPERATIVE DIAGNOSIS:    1.  Symptomatic Uterine Prolapse      POSTOPERATIVE DIAGNOSIS   1.  Symptomatic Uterine Prolapse    OPERATIVE PROCEDURE:    1.  Total vaginal hysterectomy  2.  Posterior colporrhaphy  3.  Sacrospinous Ligament Fixation    SURGEON:  Mckayla Hsu MD    ASSISTANT:  Neymar Mckeon MD    ANESTHESIA:  General    ESTIMATED BLOOD LOSS:  250 ml    FINDINGS:  Complete uterine prolapse, significant rectocele, minimal cystocele following SSLF.    COMPLICATIONS:  None    PROCEDURE IN DETAIL:    The procedure was explained to the patient and informed consent was obtained.  The patient was brought to the operating room where general anesthesia was administered.  An in and out catheter was placed to drain the bladder.  She was placed in the dorsal lithotomy position, and she was prepped and draped in the usual sterile manner.  A time out was performed.      A weighted speculum was placed in the vagina and the cervix was grasped with 2 leighy tenaculums.  The cervix was injected with 10 ml of dilute pitressin.  A circumferential incision was then made around the cervix with a scalpel.  The vagina was then bluntly dissected off of the underlying cervix.  The anterior peritoneum was grasped with pickups and entered sharply with Rm scissors.  A retractor was placed within the anterior peritoneal cavity.  The posterior peritoneum was grasped with pickups and entered sharply with Rm scissors.  A long weighted speculum was placed within the posterior peritoneal cavity.  A curved heany clamp was then used to grasp the uterosacral ligament on each side.  The pedicle was cut and suture ligated with 0-vicryl suture.  These sutures were held.  The uterine vessels were then also clamped, cut, and ligated with 0-vicryl suture.  Progressive dissection was then done towards the uterine fundus in a similar manner.  The utero-ovarian ligaments were clamped, cut, and suture ligated with  0-vicryl.  The uterus and cervix were completely removed and sent to pathology.  Both ovaries and tubes appeared completely normal, but were left in place, due to being high in the pelvis.    At this time, a sponge stick was used to inspect all pedicles.  Good hemostasis was noted of all pedicles.  A sacrospinous ligament fixation was then performed.  Two sutures of 2-0 vicryl were placed in the right sacrospinous ligament, 1-2 cm medial to the spine, using the [employer] suture placement device.  These sutures were brought through the vaginal cuff anteriorly and posteriorly for vaginal cuff support.  Good support of the vaginal cuff was noted.  Good hemostasis was noted once again.    At this time, a posterior repair was performed.  A triangular incision was made at the introitus.  A midline incision was made over the posterior vaginal wall.  The vaginal mucosa was dissected away from the underlying fascia.  The levator muscles were plicated in the midline with a 0-vicryl suture in a running fashion.  Excess vaginal mucosa was trimmed away.  The vaginal mucosa and perineum were closed with 2-0 vicryl suture in a running fashion.    A vaginal packing and merino catheter were placed.  The patient tolerated the procedure well and was awakened from anesthesia and brought to the recovery room in stable condition.  All counts were correct x 3.

## 2019-01-18 NOTE — ASSESSMENT & PLAN NOTE
Proceed with TVH, A&P repair, and SSLF, as planned.  Patient desires removal of ovaries only if easily accessible by vaginal route.  Patient medically cleared for surgery by her PCP, Dr. Jillian Salcedo.

## 2019-01-18 NOTE — SUBJECTIVE & OBJECTIVE
Obstetric History       T3      L3     SAB0   TAB0   Ectopic0   Multiple0   Live Births3       # Outcome Date GA Lbr Yuri/2nd Weight Sex Delivery Anes PTL Lv   3 Term         ABBI   2 Term         ABBI   1 Term         ABBI        Past Medical History:   Diagnosis Date    Breast cancer     Hypertension     Thyroid disease     s/p radioactive iodine treatment, now with hypothyroidism     Past Surgical History:   Procedure Laterality Date    BREAST LUMPECTOMY Left     SHOULDER SURGERY Right     TONSILLECTOMY, ADENOIDECTOMY      TOTAL KNEE ARTHROPLASTY Right        PTA Medications   Medication Sig    aspirin (ECOTRIN) 81 MG EC tablet Take 81 mg by mouth once daily.    hydroCHLOROthiazide (HYDRODIURIL) 50 MG tablet     levothyroxine (SYNTHROID) 150 MCG tablet     losartan (COZAAR) 50 MG tablet     meloxicam (MOBIC) 15 MG tablet     tamoxifen (NOLVADEX) 20 MG Tab     verapamil (CALAN-SR) 180 MG CR tablet        Review of patient's allergies indicates:   Allergen Reactions    Codeine Nausea And Vomiting    Penicillins Rash        Family History     Problem Relation (Age of Onset)    Breast cancer Father, Sister    Hypertension Mother        Tobacco Use    Smoking status: Never Smoker    Smokeless tobacco: Never Used   Substance and Sexual Activity    Alcohol use: Yes     Comment: Daily bloody stephy/wine    Drug use: No    Sexual activity: Not Currently     Review of Systems   Constitutional: Negative for chills and fever.   Respiratory: Negative for cough and shortness of breath.    Cardiovascular: Negative for chest pain.   Gastrointestinal: Negative for abdominal pain, nausea and vomiting.   Genitourinary: Negative for dysuria, menorrhagia, pelvic pain and vaginal bleeding.      Objective:     Vital Signs (Most Recent):    Vital Signs (24h Range):           There is no height or weight on file to calculate BMI.    No LMP recorded. Patient is postmenopausal.    Physical Exam:    Constitutional: She is oriented to person, place, and time. She appears well-developed and well-nourished. No distress.    HENT:   Head: Normocephalic and atraumatic.     Neck: Neck supple.    Cardiovascular: Normal rate and regular rhythm.     Pulmonary/Chest: Effort normal.        Abdominal: Soft. She exhibits no distension. There is no tenderness.     Genitourinary:   Genitourinary Comments: 4th degree uterine prolapse, 3rd degree cystocele, 2nd degree rectocele.           Musculoskeletal: She exhibits no edema or tenderness.       Neurological: She is alert and oriented to person, place, and time.    Skin: Skin is warm and dry.    Psychiatric: She has a normal mood and affect.       Laboratory:  I have personallly reviewed all pertinent lab results from the last 24 hours.    Diagnostic Results:  EKG and CXR reviewed.

## 2019-01-18 NOTE — INTERVAL H&P NOTE
The patient has been examined and the H&P has been reviewed:    I concur with the findings and no changes have occurred since H&P was written.    Anesthesia/Surgery risks, benefits and alternative options discussed and understood by patient/family.          Active Hospital Problems    Diagnosis  POA    Cystocele with uterine prolapse [N81.4]  Yes      Resolved Hospital Problems   No resolved problems to display.

## 2019-01-18 NOTE — PLAN OF CARE
Problem: Adult Inpatient Plan of Care  Goal: Plan of Care Review  Outcome: Ongoing (interventions implemented as appropriate)  Fall precautions maintained. Pt free from falls/injuries.  Patient complains of pain. Pain relieved with PRN meds.  Antibiotics given as prescribed.  Ambulates and repositions with assistance.  Plan of care and medications discussed with patient.  Patient verbalized understanding.  Bed locked and low, call bell within reach.  Chart check done. Will continue to monitor.

## 2019-01-19 VITALS
HEART RATE: 72 BPM | RESPIRATION RATE: 18 BRPM | WEIGHT: 231 LBS | BODY MASS INDEX: 42.51 KG/M2 | DIASTOLIC BLOOD PRESSURE: 58 MMHG | OXYGEN SATURATION: 95 % | TEMPERATURE: 98 F | HEIGHT: 62 IN | SYSTOLIC BLOOD PRESSURE: 121 MMHG

## 2019-01-19 PROBLEM — Z90.710 S/P VAGINAL HYSTERECTOMY: Status: ACTIVE | Noted: 2019-01-19

## 2019-01-19 PROCEDURE — 99900035 HC TECH TIME PER 15 MIN (STAT)

## 2019-01-19 PROCEDURE — 25000003 PHARM REV CODE 250: Performed by: OBSTETRICS & GYNECOLOGY

## 2019-01-19 PROCEDURE — 25000003 PHARM REV CODE 250

## 2019-01-19 PROCEDURE — 97116 GAIT TRAINING THERAPY: CPT

## 2019-01-19 PROCEDURE — 94799 UNLISTED PULMONARY SVC/PX: CPT

## 2019-01-19 PROCEDURE — 25000003 PHARM REV CODE 250: Performed by: HOSPITALIST

## 2019-01-19 PROCEDURE — 94761 N-INVAS EAR/PLS OXIMETRY MLT: CPT

## 2019-01-19 PROCEDURE — 97530 THERAPEUTIC ACTIVITIES: CPT

## 2019-01-19 PROCEDURE — 63600175 PHARM REV CODE 636 W HCPCS: Performed by: OBSTETRICS & GYNECOLOGY

## 2019-01-19 RX ORDER — HYDROCODONE BITARTRATE AND ACETAMINOPHEN 5; 325 MG/1; MG/1
1 TABLET ORAL EVERY 6 HOURS PRN
Qty: 20 TABLET | Refills: 0 | Status: SHIPPED | OUTPATIENT
Start: 2019-01-19

## 2019-01-19 RX ORDER — HYDROCODONE BITARTRATE AND ACETAMINOPHEN 5; 325 MG/1; MG/1
1 TABLET ORAL EVERY 4 HOURS PRN
Status: DISCONTINUED | OUTPATIENT
Start: 2019-01-19 | End: 2019-01-19 | Stop reason: HOSPADM

## 2019-01-19 RX ORDER — HYDROCODONE BITARTRATE AND ACETAMINOPHEN 7.5; 325 MG/1; MG/1
1 TABLET ORAL EVERY 4 HOURS PRN
Status: DISCONTINUED | OUTPATIENT
Start: 2019-01-19 | End: 2019-01-19 | Stop reason: HOSPADM

## 2019-01-19 RX ORDER — VERAPAMIL HYDROCHLORIDE 40 MG/1
40 TABLET ORAL EVERY 8 HOURS
Status: DISCONTINUED | OUTPATIENT
Start: 2019-01-19 | End: 2019-01-19 | Stop reason: HOSPADM

## 2019-01-19 RX ADMIN — VERAPAMIL HYDROCHLORIDE 40 MG: 40 TABLET ORAL at 05:01

## 2019-01-19 RX ADMIN — LEVOTHYROXINE SODIUM 150 MCG: 150 TABLET ORAL at 05:01

## 2019-01-19 RX ADMIN — HYDROCHLOROTHIAZIDE 50 MG: 25 TABLET ORAL at 08:01

## 2019-01-19 RX ADMIN — HYDROCODONE BITARTRATE AND ACETAMINOPHEN 1 TABLET: 7.5; 325 TABLET ORAL at 11:01

## 2019-01-19 RX ADMIN — KETOROLAC TROMETHAMINE 15 MG: 30 INJECTION, SOLUTION INTRAMUSCULAR; INTRAVENOUS at 05:01

## 2019-01-19 RX ADMIN — SODIUM CHLORIDE, SODIUM LACTATE, POTASSIUM CHLORIDE, AND CALCIUM CHLORIDE: .6; .31; .03; .02 INJECTION, SOLUTION INTRAVENOUS at 05:01

## 2019-01-19 RX ADMIN — LOSARTAN POTASSIUM 50 MG: 50 TABLET, FILM COATED ORAL at 08:01

## 2019-01-19 NOTE — HOSPITAL COURSE
Patient underwent TVH, SSLF, and posterior repair without complication.    Only post op issue is difficulty ambulating, due to baseline weakness and joint issues.  On post op day one, patient found stable for discharge to home.  Home physical therapy arranged.

## 2019-01-19 NOTE — PLAN OF CARE
Met with pt at bedside.  Pt has no HH preference; however, pt lives in [place].  Called placed to [facility].  Pt will d/c home to [address].  Spoke with Courtney with [facility].  Referral faxed to [phone], contact # [phone].       01/19/19 1045   Discharge Assessment   Assessment Type Discharge Planning Assessment   Confirmed/corrected address and phone number on facesheet? Yes   Assessment information obtained from? Patient   Expected Length of Stay (days) 2   Communicated expected length of stay with patient/caregiver yes   Prior to hospitilization cognitive status: Alert/Oriented   Prior to hospitalization functional status: Independent   Current cognitive status: Alert/Oriented   Facility Arrived From: Home   Lives With spouse   Able to Return to Prior Arrangements yes   Is patient able to care for self after discharge? Yes   Who are your caregiver(s) and their phone number(s)? Sincere () [phone]   Patient's perception of discharge disposition admitted as an inpatient   Readmission Within the Last 30 Days no previous admission in last 30 days   Patient currently being followed by outpatient case management? No   Patient currently receives any other outside agency services? No   Equipment Currently Used at Home none   Do you have any problems affording any of your prescribed medications? No   Is the patient taking medications as prescribed? yes   Does the patient have transportation home? Yes   Transportation Anticipated family or friend will provide   Does the patient receive services at the [facility]? No   Discharge Plan A Home Health   Discharge Plan B Home Health   DME Needed Upon Discharge  none   Patient/Family in Agreement with Plan yes

## 2019-01-19 NOTE — DISCHARGE INSTRUCTIONS
Acetaminophen; Hydrocodone tablets or capsules  What is this medicine?  ACETAMINOPHEN; HYDROCODONE (a set a JINNY lobo fen; mindi droe KOE done) is a pain reliever. It is used to treat moderate to severe pain.  How should I use this medicine?  Take this medicine by mouth with a glass of water. Follow the directions on the prescription label. You can take it with or without food. If it upsets your stomach, take it with food. Do not take your medicine more often than directed.  A special MedGuide will be given to you by the pharmacist with each prescription and refill. Be sure to read this information carefully each time.  Talk to your pediatrician regarding the use of this medicine in children. Special care may be needed.  What side effects may I notice from receiving this medicine?  Side effects that you should report to your doctor or health care professional as soon as possible:  · allergic reactions like skin rash, itching or hives, swelling of the face, lips, or tongue  · breathing problems  · confusion  · redness, blistering, peeling or loosening of the skin, including inside the mouth  · signs and symptoms of low blood pressure like dizziness; feeling faint or lightheaded, falls; unusually weak or tired  · trouble passing urine or change in the amount of urine  · yellowing of the eyes or skin  Side effects that usually do not require medical attention (report to your doctor or health care professional if they continue or are bothersome):  · constipation  · dry mouth  · nausea, vomiting  · tiredness  What may interact with this medicine?  This medicine may interact with the following medications:  · alcohol  · antiviral medicines for HIV or AIDS  · atropine  · antihistamines for allergy, cough and cold  · certain antibiotics like erythromycin, clarithromycin  · certain medicines for anxiety or sleep  · certain medicines for bladder problems like oxybutynin, tolterodine  · certain medicines for depression like  amitriptyline, fluoxetine, sertraline  · certain medicines for fungal infections like ketoconazole and itraconazole  · certain medicines for Parkinson's disease like benztropine, trihexyphenidyl  · certain medicines for seizures like carbamazepine, phenobarbital, phenytoin, primidone  · certain medicines for stomach problems like dicyclomine, hyoscyamine  · certain medicines for travel sickness like scopolamine  · general anesthetics like halothane, isoflurane, methoxyflurane, propofol  · ipratropium  · local anesthetics like lidocaine, pramoxine, tetracaine  · MAOIs like Carbex, Eldepryl, Marplan, Nardil, and Parnate  · medicines that relax muscles for surgery  · other medicines with acetaminophen  · other narcotic medicines for pain or cough  · phenothiazines like chlorpromazine, mesoridazine, prochlorperazine, thioridazine  · rifampin  What if I miss a dose?  If you miss a dose, take it as soon as you can. If it is almost time for your next dose, take only that dose. Do not take double or extra doses.  Where should I keep my medicine?  Keep out of the reach of children. This medicine can be abused. Keep your medicine in a safe place to protect it from theft. Do not share this medicine with anyone. Selling or giving away this medicine is dangerous and against the law.  This medicine may cause accidental overdose and death if it taken by other adults, children, or pets. Mix any unused medicine with a substance like cat litter or coffee grounds. Then throw the medicine away in a sealed container like a sealed bag or a coffee can with a lid. Do not use the medicine after the expiration date.  Store at room temperature between 15 and 30 degrees C (59 and 86 degrees F).  What should I tell my health care provider before I take this medicine?  They need to know if you have any of these conditions:  · brain tumor  · Crohn's disease, inflammatory bowel disease, or ulcerative colitis  · drug abuse or addiction  · head  injury  · heart or circulation problems  · if you often drink alcohol  · kidney disease or problems going to the bathroom  · liver disease  · lung disease, asthma, or breathing problems  · an unusual or allergic reaction to acetaminophen, hydrocodone, other opioid analgesics, other medicines, foods, dyes, or preservatives  · pregnant or trying to get pregnant  · breast-feeding  What should I watch for while using this medicine?  Tell your doctor or health care professional if your pain does not go away, if it gets worse, or if you have new or a different type of pain. You may develop tolerance to the medicine. Tolerance means that you will need a higher dose of the medicine for pain relief. Tolerance is normal and is expected if you take the medicine for a long time.  Do not suddenly stop taking your medicine because you may develop a severe reaction. Your body becomes used to the medicine. This does NOT mean you are addicted. Addiction is a behavior related to getting and using a drug for a non-medical reason. If you have pain, you have a medical reason to take pain medicine. Your doctor will tell you how much medicine to take. If your doctor wants you to stop the medicine, the dose will be slowly lowered over time to avoid any side effects.  There are different types of narcotic medicines (opiates). If you take more than one type at the same time or if you are taking another medicine that also causes drowsiness, you may have more side effects. Give your health care provider a list of all medicines you use. Your doctor will tell you how much medicine to take. Do not take more medicine than directed. Call emergency for help if you have problems breathing or unusual sleepiness.  Do not take other medicines that contain acetaminophen with this medicine. Always read labels carefully. If you have questions, ask your doctor or pharmacist.  If you take too much acetaminophen get medical help right away. Too much  acetaminophen can be very dangerous and cause liver damage. Even if you do not have symptoms, it is important to get help right away.  You may get drowsy or dizzy. Do not drive, use machinery, or do anything that needs mental alertness until you know how this medicine affects you. Do not stand or sit up quickly, especially if you are an older patient. This reduces the risk of dizzy or fainting spells. Alcohol may interfere with the effect of this medicine. Avoid alcoholic drinks.  The medicine will cause constipation. Try to have a bowel movement at least every 2 to 3 days. If you do not have a bowel movement for 3 days, call your doctor or health care professional.  Your mouth may get dry. Chewing sugarless gum or sucking hard candy, and drinking plenty of water may help. Contact your doctor if the problem does not go away or is severe.  NOTE:This sheet is a summary. It may not cover all possible information. If you have questions about this medicine, talk to your doctor, pharmacist, or health care provider. Copyright© 2017 [employer]        Discharge Instructions for Vaginal Hysterectomy   Vaginal hysterectomy is surgery to remove the uterus and often the cervix. It takes 4 to 6 weeks to recover from the procedure. Heres what you need to know about caring for yourself during this time. Follow these and any other instructions you are given.  Two types of vaginal hysterectomy  Vaginal hysterectomy is done through an incision inside the vagina. In some cases, 2 to 3 small incisions are also made in the skin. Instruments are then put through the small incisions to assist the procedure. This is called laparoscopically assisted vaginal hysterectomy or LAVH. If a hysterectomy is done vaginally, the cervix is always removed as well.     Home care  · Plan to rest at home for 3 to 5 days after the surgery.  · Take all prescribed medicine exactly as directed.  · Continue the coughing and deep breathing exercises you  learned in the hospital.  · If you had LAVH, you will have several small incisions on your belly. Keep the incisions clean and dry. Change bandages as instructed.  · After LAVH, you may have pain in your shoulder. This is normal and due to gas used to expand your belly during the surgery. The pain may last up to 7 days.  · If you have stitches inside your vagina, they will absorb over time and do not need to be taken out.  · Use sanitary pads to absorb vaginal bleeding or discharge. Light bleeding is likely at first. You may have a brownish discharge for up to 6 weeks.  · Do not use tampons or douches. They can cause infection.  · Avoid constipation, which causes straining to pass stool. Eat fruits, vegetables, and whole-grain foods. Drink at least 8 glasses of fluid each day. If needed, ask your health care provider whether you should use a stool softener.  Activity  Full recovery may take 2 to 4 weeks. This varies from woman to woman. Increase your activities a little bit each day. While you are recovering:  · Do not drive while you are taking opioid or other narcotic pain medicines.  · Walk as often as you feel able. Walking prevents blood clots from forming. It also helps speed healing.  · Climb stairs slowly. If you get tired, pause every few steps.  · Do not do sports or strenuous activity until your health care provider says its OK.  · Avoid lifting anything heavier than 10 pounds for 4 to 6 weeks.  · Ask others to help with chores and errands.  · Bathe or shower according to your health care providers instructions.  · Do not have sex until your health care provider says its OK.  · Ask your health care provider when you can return to work.  Follow-up care  You will visit the health care provider again to be sure you are healing well. Keep all follow-up appointments. Be sure to tell your health care provider if you have hot flashes, mood swings, or irritability. Medicine may help ease these symptoms.  Life  after hysterectomy  Because the procedure removes your uterus, you will no longer have menstrual periods. You will not be able to become pregnant. Also, you may not need Pap tests if your cervix was removed. Your health care provider can discuss these and other changes with you.  When to call your health care provider  Call your health care provider right away if you have any of the following after your surgery:  · Fever of 100.4°F (38°C) or higher  · Vaginal bleeding that is bright red or soaks more than 1 pad in 60 minutes  · Smelly or green-colored discharge from the vagina  · Shortness of breath or chest pain  · Nausea or comiting that continues for more than 1 day or that make it impossible to eat or drink  · Inability to move the bowels for 3 days  · Loose or watery stools 2 or more times a day OR bloody stools  · Trouble urinating or burning during urination  · Severe pain or bloating in your abdomen  · Pain or swelling in your legs  · For LAVH, redness, swelling, drainage, or increasing pain at an incision site  · You feel unusually depressed or sad after the surgery   Date Last Reviewed: 5/10/2015  © 7898-6354 [employer]. [address] [place] [address], [place] [address]. All rights reserved. This information is not intended as a substitute for professional medical care. Always follow your healthcare professional's instructions.

## 2019-01-19 NOTE — PROGRESS NOTES
[facility] -   Obstetrics & Gynecology  Progress Note    Patient Name: Shirin Cantu  MRN: [id number]  Admission Date: 2019  Primary Care Provider: To Obtain Unable  Principal Problem: S/P vaginal hysterectomy    Subjective:     HPI:  78 y.o. female  with symptomatic uterine prolapse.  Also with difficulty emptying bladder.  No urinary leakage.  Has been wearing pessary for past month.  Patient desires TVH, A&P repair, with SSLF.      Interval History:  No complaints.  Pain controlled.  Tolerating diet.  Ambulating with difficulty using walker, voiding.  Light vaginal bleeding.    Scheduled Meds:   aspirin  81 mg Oral Daily    hydroCHLOROthiazide  50 mg Oral Daily    ibuprofen  800 mg Oral Q8H    levothyroxine  150 mcg Oral Before breakfast    losartan  50 mg Oral Daily    nozaseptin   Each Nare BID    verapamil 20mg (custom half-tablet)  20 mg Oral Q8H    And    verapamil  40 mg Oral Q8H     Continuous Infusions:  PRN Meds:diphenhydrAMINE, HYDROcodone-acetaminophen, HYDROcodone-acetaminophen, HYDROmorphone, influenza, ondansetron, promethazine (PHENERGAN) IVPB, simethicone    Review of patient's allergies indicates:   Allergen Reactions    Codeine Nausea And Vomiting    Penicillins Rash       Objective:     Vital Signs (Most Recent):  Temp: 98.3 °F (36.8 °C) (19)  Pulse: 82 (19)  Resp: 20 (19)  BP: (!) 146/67 (19)  SpO2: (!) 92 % (19) Vital Signs (24h Range):  Temp:  [97 °F (36.1 °C)-99.6 °F (37.6 °C)] 98.3 °F (36.8 °C)  Pulse:  [55-82] 82  Resp:  [15-20] 20  SpO2:  [92 %-100 %] 92 %  BP: (111-146)/(55-67) 146/67     Weight: 104.8 kg (231 lb)  Body mass index is 42.25 kg/m².  No LMP recorded. Patient has had a hysterectomy.    I&O (Last 24H):    Intake/Output Summary (Last 24 hours) at 2019 0816  Last data filed at 2019 0642  Gross per 24 hour   Intake 4462.49 ml   Output 900 ml   Net 3562.49 ml       Physical Exam:    Constitutional: She is oriented to person, place, and time. She appears well-developed and well-nourished. No distress.       Cardiovascular: Normal rate.     Pulmonary/Chest: Effort normal.        Abdominal: Soft. She exhibits no distension and no mass. There is no tenderness.             Musculoskeletal: She exhibits no edema or tenderness.       Neurological: She is alert and oriented to person, place, and time.     Psychiatric: She has a normal mood and affect.       Laboratory:  I have personallly reviewed all pertinent lab results from the last 24 hours.        Assessment/Plan:     * S/P vaginal hysterectomy    POD#1 s/p TVH, SSLF, posterior repair - stable and doing well  Physical therapy consult placed for assistance with ambulation while in hospital  Home health orders placed for home physical therapy after discharge  Plan to discharge patient once she can ambulate independently     Essential hypertension    Continue home meds     Cystocele with uterine prolapse    Proceed with TVH, A&P repair, and SSLF, as planned.  Patient desires removal of ovaries only if easily accessible by vaginal route.  Patient medically cleared for surgery by her PCP, Dr. Jillian Salcedo.         Mckayla Hsu MD  Obstetrics & Gynecology  [facility] -

## 2019-01-19 NOTE — PT/OT/SLP PROGRESS
Physical Therapy  Treatment    Shirin Cantu   MRN: [id number]   Admitting Diagnosis: S/P vaginal hysterectomy    PT Received On: 01/19/19  PT Start Time: 1020     PT Stop Time: 1045    PT Total Time (min): 25 min       Billable Minutes:  Gait Training 15 and Therapeutic Activity 10    Treatment Type: Treatment  PT/PTA: PTA     PTA Visit Number: 1       General Precautions: Standard, fall  Orthopedic Precautions: N/A   Braces: N/A    Spiritual, Cultural Beliefs, Taoism Practices, Values that Affect Care: no    Subjective:  Communicated with Epic AND NURSERACHELLE prior to session.  PATIENT AGREE TO TX NOW.    Pain/Comfort  Pain Rating 1: 0/10    Objective:   Patient found with: peripheral IV, SUPINE . PATIENT ASSISTED OOB T/FS , GT INTO HALLWAY 200'X1 , GOOD STEADY PACE AT CLOSE SBAX1 WITH RW. PATIENT INSTRUCTED WITH ATTILA LE EXERCISES, PATIENT ASSISTED TO BATHROOM FOR TOLIETING, CALL BUTTON IN REACH, NURSING NOTIFIED.    Functional Mobility:  Bed Mobility:    SBAX1 FOR T/FS OOB.    Transfers:   SIT TO STAND, STAND TO SIT AT SBAX1.    Gait:    AMBUALTE INTO HALLWAY AT SBAX1 WITH RW, GOOD STEADY PACE.    Stairs:  N/A    Balance:   Static Sit: FAIR+: Able to take MINIMAL challenges from all directions  Dynamic Sit: FAIR+: Maintains balance through MINIMAL excursions of active trunk motion  Static Stand: FAIR+: Takes MINIMAL challenges from all directions  Dynamic stand: FAIR+: Needs CLOSE SUPERVISION during gait and is able to right self with minor LOB     Therapeutic Activities and Exercises:  INSTRUCTED WITH ATTILA LE EXERCISES SHORT SEATED, OOB T/FS, GT INTO HALLWAY.    AM-PAC 6 CLICK MOBILITY  How much help from another person does this patient currently need?   1 = Unable, Total/Dependent Assistance  2 = A lot, Maximum/Moderate Assistance  3 = A little, Minimum/Contact Guard/Supervision  4 = None, Modified [place]/Independent    Turning over in bed (including adjusting bedclothes, sheets and blankets)?:  4  Sitting down on and standing up from a chair with arms (e.g., wheelchair, bedside commode, etc.): 4  Moving from lying on back to sitting on the side of the bed?: 4  Moving to and from a bed to a chair (including a wheelchair)?: 4  Need to walk in hospital room?: 3  Climbing 3-5 steps with a railing?: 1  Basic Mobility Total Score: 20    AM-PAC Raw Score CMS G-Code Modifier Level of Impairment Assistance   6 % Total / Unable   7 - 9 CM 80 - 100% Maximal Assist   10 - 14 CL 60 - 80% Moderate Assist   15 - 19 CK 40 - 60% Moderate Assist   20 - 22 CJ 20 - 40% Minimal Assist   23 CI 1-20% SBA / CGA   24 CH 0% Independent/ Mod I     Patient left UP ON TOLIET AT PRESENT TIME, NURSING NOTIFIED, CALL BUTTON IN REACH    Assessment:  Shirin Cantu is a 78 y.o. female with a medical diagnosis of S/P vaginal hysterectomy .    Rehab identified problem list/impairments: Rehab identified problem list/impairments: weakness, impaired self care skills, impaired endurance, impaired functional mobilty, gait instability, impaired balance    Rehab potential is excellent.    Activity tolerance: Excellent    Discharge recommendations: Discharge Facility/Level of Care Needs: other (see comments)     Barriers to discharge:      Equipment recommendations: Equipment Needed After Discharge: none     GOALS:   Multidisciplinary Problems     Physical Therapy Goals        Problem: Physical Therapy Goal    Goal Priority Disciplines Outcome Goal Variances Interventions   Physical Therapy Goal     PT, PT/OT Ongoing (interventions implemented as appropriate)     Description:  PT WILL BE SEEN FOR P.T. FOR A MIN OF 5 OUT OF 7 DAYS A WEEK  LT19  1. PT WILL COMPLETE BED MOBILITY MOD I  2. PT WILL T/F TO CHAIR WITH RW MOD I  3. PT WILL GT TRAIN X 250' MOD I WITH RW                       PLAN:    Patient to be seen 5 x/week  to address the above listed problems via therapeutic activities, therapeutic exercises, gait training  Plan of Care  expires: 01/25/19  Plan of Care reviewed with: patient         Courtney Aguilar, PTA  01/19/2019

## 2019-01-19 NOTE — PLAN OF CARE
Problem: Adult Inpatient Plan of Care  Goal: Plan of Care Review  Outcome: Ongoing (interventions implemented as appropriate)  Fall prevention precautions maintained, pt remained free of falls throughout shift, call bell and personal items within reach, merino care performed. 24 hour chart check completed. Will continue to monitor

## 2019-01-19 NOTE — NURSING
D/C merino catheter and vaginal packing per order. Patient tolerated well. Education provided. Patient due to void by 1200. Patient verbalized understanding. Hat in toilet. Will continue to monitor.

## 2019-01-19 NOTE — PROGRESS NOTES
Referral faxed to [facility]; fax confirmation received.  [phone].  Spoke with Courtney on call nurse.

## 2019-01-19 NOTE — PLAN OF CARE
Problem: Adult Inpatient Plan of Care  Goal: Plan of Care Review  Outcome: Outcome(s) achieved Date Met: 01/19/19  Discharge instructions and medication reviewed with patient. Verbalized understanding using teach back method. Patient ambulates with stand by assistance. Regular diet tolerated. Peripheral IV removed per order. Patient remained free from falls and/or injuries. Safety measures to remain in place until d/c. Case management states HH set up per orders. PT evaluation complete. Patient awaiting transportation home. No further needs at this time.

## 2019-01-19 NOTE — PLAN OF CARE
Problem: Physical Therapy Goal  Goal: Physical Therapy Goal  PT WILL BE SEEN FOR P.T. FOR A MIN OF 5 OUT OF 7 DAYS A WEEK  LT19  1. PT WILL COMPLETE BED MOBILITY MOD I  2. PT WILL T/F TO CHAIR WITH RW MOD I  3. PT WILL GT TRAIN X 250' MOD I WITH RW     Outcome: Ongoing (interventions implemented as appropriate)  PATIENT DID WELL WITH OOB T/FS, TOLIETING , GT INTO HALLWAY AT SBAX1 200' X1 WITH RW  . PATIENT STEADY ON HER FEET, REQUIRE CUES FOR PROPER BODY ALIGNMENT WITH AD DURING GT.

## 2019-01-19 NOTE — ASSESSMENT & PLAN NOTE
POD#1 s/p TVH, SSLF, posterior repair - stable and doing well  Physical therapy consult placed for assistance with ambulation while in hospital  Home health orders placed for home physical therapy after discharge  Plan to discharge patient once she can ambulate independently

## 2019-01-22 NOTE — DISCHARGE SUMMARY
[facility] -   Obstetrics & Gynecology  Discharge Summary    Patient Name: Shirin Cantu  MRN: [id number]  Admission Date: 2019  Hospital Length of Stay: 1 days  Discharge Date and Time: 2019  2:41 PM  Attending Physician: No att. providers found   Discharging Provider: Mckayla Hsu MD  Primary Care Provider: To Obtain Unable    HPI:  78 y.o. female  with symptomatic uterine prolapse.  Also with difficulty emptying bladder.  No urinary leakage.  Has been wearing pessary for past month.  Patient desires TVH, A&P repair, with SSLF.      Hospital Course:  Patient underwent TVH, SSLF, and posterior repair without complication.    Only post op issue is difficulty ambulating, due to baseline weakness and joint issues.  On post op day one, patient found stable for discharge to home.  Home physical therapy arranged.    Procedure(s) (LRB):  HYSTERECTOMY, TOTAL, VAGINAL (N/A)  COLPORRHAPHY, POSTERIOR (N/A)  FIXATION, LIGAMENT, SACROSPINOUS (N/A)     Consults (From admission, onward)        Status Ordering Provider     Inpatient consult to Social Work  Once     Provider:  (Not yet assigned)    Completed MCKAYLA HSU          Significant Diagnostic Studies: Labs: All labs within the past 24 hours have been reviewed    Pending Diagnostic Studies:     None        Final Active Diagnoses:    Diagnosis Date Noted POA    PRINCIPAL PROBLEM:  S/P vaginal hysterectomy [Z90.710] 2019 No    Essential hypertension [I10] 2019 Yes    Cystocele with uterine prolapse [N81.4] 01/10/2019 Yes      Problems Resolved During this Admission:        Discharged Condition: stable    Disposition: Home or Self Care    Follow Up:  Follow-up Information     Mckayla Hsu MD In 4 weeks.    Specialties:  Obstetrics, Obstetrics and Gynecology  Why:  Postop check  Contact information:  [address] [place] [address] DR Bianca ELLIS [address] [phone]                 Patient Instructions:       Call MD for:  temperature >100.4     Call MD for:  persistent nausea and vomiting or diarrhea     Call MD for:  severe uncontrolled pain     Call MD for:  redness, tenderness, or signs of infection (pain, swelling, redness, odor or green/yellow discharge around incision site)     Call MD for:  difficulty breathing or increased cough     Call MD for:  persistent dizziness, light-headedness, or visual disturbances     No dressing needed     Medications:  Reconciled Home Medications:      Medication List      START taking these medications    HYDROcodone-acetaminophen 5-325 mg per tablet  Commonly known as:  NORCO  Take 1 tablet by mouth every 6 (six) hours as needed for Pain.        CONTINUE taking these medications    aspirin 81 MG EC tablet  Commonly known as:  ECOTRIN  Take 81 mg by mouth once daily.     hydroCHLOROthiazide 50 MG tablet  Commonly known as:  HYDRODIURIL     levothyroxine 150 MCG tablet  Commonly known as:  SYNTHROID     losartan 50 MG tablet  Commonly known as:  COZAAR     meloxicam 15 MG tablet  Commonly known as:  MOBIC     tamoxifen 20 MG Tab  Commonly known as:  NOLVADEX     verapamil 180 MG CR tablet  Commonly known as:  CALAN-SR            Mckayla Hsu MD  Obstetrics & Gynecology  [facility] -

## 2019-01-25 ENCOUNTER — TELEPHONE (OUTPATIENT)
Dept: OBSTETRICS AND GYNECOLOGY | Facility: CLINIC | Age: 79
End: 2019-01-25

## 2019-01-25 NOTE — TELEPHONE ENCOUNTER
----- Message from Ina Akbar sent at 1/25/2019 11:33 AM CST -----  Contact: [place] [facility]  [place] called to speak with the nurse; she received the referral for therapy. The patient is refusing the therapy and wants to get more information. The patient won't take what they say and wants to speak with the doctors office directly. She can be contacted at [phone].    Thanks,  Ina

## 2019-01-31 ENCOUNTER — TELEPHONE (OUTPATIENT)
Dept: OBSTETRICS AND GYNECOLOGY | Facility: CLINIC | Age: 79
End: 2019-01-31

## 2019-01-31 NOTE — TELEPHONE ENCOUNTER
Spoke to home health nurse Jaymie, Jaymie states pt is refusing home health therapy once again (pt also refused therapy on 01/25/2019) pt states she does not know why she needs therapy and wants to speak to dr. Hsu directly before she accepts any therapy. Please advise.

## 2019-02-14 ENCOUNTER — TELEPHONE (OUTPATIENT)
Dept: OBSTETRICS AND GYNECOLOGY | Facility: CLINIC | Age: 79
End: 2019-02-14

## 2019-02-14 NOTE — TELEPHONE ENCOUNTER
Patient's  is concerned because patient is not doing any type of rehab (been refusing assistance from Home health).  He feels patient is loosing strength and gaining a lot of weight and would like for Dr. Hsu to encourage patient to do some OT for strength building, etc.  Informed  message would be forwarded to Dr. Hsu - patient's appointment is tomorrow 2/15.

## 2019-02-14 NOTE — TELEPHONE ENCOUNTER
----- Message from Jackeline Augustinite sent at 2/14/2019 10:08 AM CST -----  Contact: Sincere (pt's )   Pt called and stated he needs to give info to the nurse before the pt's prio [phone].    Thanks,  TF

## 2019-02-15 ENCOUNTER — OFFICE VISIT (OUTPATIENT)
Dept: OBSTETRICS AND GYNECOLOGY | Facility: CLINIC | Age: 79
End: 2019-02-15
Payer: MEDICARE

## 2019-02-15 VITALS
SYSTOLIC BLOOD PRESSURE: 132 MMHG | WEIGHT: 227.31 LBS | BODY MASS INDEX: 41.57 KG/M2 | DIASTOLIC BLOOD PRESSURE: 70 MMHG

## 2019-02-15 DIAGNOSIS — R53.81 PHYSICAL DECONDITIONING: ICD-10-CM

## 2019-02-15 DIAGNOSIS — Z98.890 POST-OPERATIVE STATE: Primary | ICD-10-CM

## 2019-02-15 PROCEDURE — 99999 PR PBB SHADOW E&M-EST. PATIENT-LVL II: CPT | Mod: PBBFAC,,, | Performed by: OBSTETRICS & GYNECOLOGY

## 2019-02-15 PROCEDURE — 99212 OFFICE O/P EST SF 10 MIN: CPT | Mod: PBBFAC | Performed by: OBSTETRICS & GYNECOLOGY

## 2019-02-15 PROCEDURE — 99024 POSTOP FOLLOW-UP VISIT: CPT | Mod: POP,,, | Performed by: OBSTETRICS & GYNECOLOGY

## 2019-02-15 PROCEDURE — 99024 PR POST-OP FOLLOW-UP VISIT: ICD-10-PCS | Mod: POP,,, | Performed by: OBSTETRICS & GYNECOLOGY

## 2019-02-15 PROCEDURE — 99999 PR PBB SHADOW E&M-EST. PATIENT-LVL II: ICD-10-PCS | Mod: PBBFAC,,, | Performed by: OBSTETRICS & GYNECOLOGY

## 2019-02-15 NOTE — PROGRESS NOTES
Patient presents today for postoperative follow up.  Pt. underwent TVH with SSLF and posterior repair on 1/18/19.  Patient is recovering well and has no complaints today.  No pain.  No vaginal bleeding.  Still has some difficulty with ambulation and tires easily.  Interested in physical therapy to help increase mobility and strength.  Ambulates with walker.    Physical Exam:  General - no acute distress  Abdomen - soft, nontender and nondistended.  No masses.  Incisions are well healed with no evidence of infection.  Pelvic - vaginal cuff intact.  Sutures still present.  No evidence of infection or granulation tissue.  Bimanual exam shows no masses or tenderness.  Extremities - nontender and no edema noted.    Encounter Diagnoses   Name Primary?    Post-operative state Yes       PLAN:  Follow up as needed.  Will send referral for outpatient physical therapy due to deconditioning.  May resume normal activities.

## 2019-02-19 ENCOUNTER — TELEPHONE (OUTPATIENT)
Dept: OBSTETRICS AND GYNECOLOGY | Facility: CLINIC | Age: 79
End: 2019-02-19

## 2019-02-19 NOTE — TELEPHONE ENCOUNTER
----- Message from Samson Deleon sent at 2/19/2019  3:35 PM CST -----  Contact: pt   Type:  Needs Medical Advice    Who Called: pt   Symptoms (please be specific): discussing therapy    How long has patient had these symptoms:    Pharmacy name and phone #:    Would the patient rather a call back or a response via [facility]? Phone   Best Call Back Number:  [phone]  Additional Information: States she's out of town and wants to discuss the therapy and needs to have the order and Dr's instructions with signature so that pt can start   Therapy : [facility]/ outpt therapy  in [place], [place]  Fax # [phone]     Pt states to pls call to discuss

## 2019-02-19 NOTE — TELEPHONE ENCOUNTER
Spoke to patient. Patient states that outpatient PT was recommended. Patient states that she needs to have something written out and signed by Dr. Hsu saying what type of PT the patient needs and the intentions/desired outcome for PT. Patient also stated that the letter should mention that patient had previous outpatient PT for a knee replacement with Josue Kaydelores that was very successful and request him. Patient needs letter faxed to [facility] at [phone]. Advised patient I would send message to Dr. Hsu. Patient verbalized understanding.

## 2020-10-02 NOTE — SUBJECTIVE & OBJECTIVE
Interval History:  No complaints.  Pain controlled.  Tolerating diet.  Ambulating with difficulty using walker, voiding.  Light vaginal bleeding.    Scheduled Meds:   aspirin  81 mg Oral Daily    hydroCHLOROthiazide  50 mg Oral Daily    ibuprofen  800 mg Oral Q8H    levothyroxine  150 mcg Oral Before breakfast    losartan  50 mg Oral Daily    nozaseptin   Each Nare BID    verapamil 20mg (custom half-tablet)  20 mg Oral Q8H    And    verapamil  40 mg Oral Q8H     Continuous Infusions:  PRN Meds:diphenhydrAMINE, HYDROcodone-acetaminophen, HYDROcodone-acetaminophen, HYDROmorphone, influenza, ondansetron, promethazine (PHENERGAN) IVPB, simethicone    Review of patient's allergies indicates:   Allergen Reactions    Codeine Nausea And Vomiting    Penicillins Rash       Objective:     Vital Signs (Most Recent):  Temp: 98.3 °F (36.8 °C) (01/19/19 0421)  Pulse: 82 (01/19/19 0421)  Resp: 20 (01/19/19 0421)  BP: (!) 146/67 (01/19/19 0421)  SpO2: (!) 92 % (01/19/19 0421) Vital Signs (24h Range):  Temp:  [97 °F (36.1 °C)-99.6 °F (37.6 °C)] 98.3 °F (36.8 °C)  Pulse:  [55-82] 82  Resp:  [15-20] 20  SpO2:  [92 %-100 %] 92 %  BP: (111-146)/(55-67) 146/67     Weight: 104.8 kg (231 lb)  Body mass index is 42.25 kg/m².  No LMP recorded. Patient has had a hysterectomy.    I&O (Last 24H):    Intake/Output Summary (Last 24 hours) at 1/19/2019 0816  Last data filed at 1/19/2019 0642  Gross per 24 hour   Intake 4462.49 ml   Output 900 ml   Net 3562.49 ml       Physical Exam:   Constitutional: She is oriented to person, place, and time. She appears well-developed and well-nourished. No distress.       Cardiovascular: Normal rate.     Pulmonary/Chest: Effort normal.        Abdominal: Soft. She exhibits no distension and no mass. There is no tenderness.             Musculoskeletal: She exhibits no edema or tenderness.       Neurological: She is alert and oriented to person, place, and time.     Psychiatric: She has a normal mood and  affect.       Laboratory:  I have personallly reviewed all pertinent lab results from the last 24 hours.       I have personally seen and examined this patient.  I have fully participated in the care of this patient. I have reviewed all pertinent clinical information, including history, physical exam, plan and the Resident’s note and agree except as noted.

## (undated) DEVICE — GLOVE PROTEXIS HYDROGEL SZ6.5

## (undated) DEVICE — SEE MEDLINE ITEM 157027

## (undated) DEVICE — SEE MEDLINE ITEM 157181

## (undated) DEVICE — SOL NS 1000CC

## (undated) DEVICE — SEE MEDLINE ITEM 152622

## (undated) DEVICE — CONTAINER SPECIMEN STRL 4OZ

## (undated) DEVICE — MANIFOLD 4 PORT

## (undated) DEVICE — SUT VICRYL PLUS 0 CT1 18IN

## (undated) DEVICE — SUT VICRYL 3-0 27 SH

## (undated) DEVICE — GLOVE PROTEXIS HYDROGEL SZ7

## (undated) DEVICE — SUT VICRYL PLUS 0 CT1 36IN

## (undated) DEVICE — GLOVE PROTEXIS HYDROGEL SZ7.5

## (undated) DEVICE — COVER OVERHEAD SURG LT BLUE

## (undated) DEVICE — PACK DRAPE PERI/GYN TIBURON

## (undated) DEVICE — SUT VICRYL PLUS 2-0 18IN

## (undated) DEVICE — SYR 10CC LUER LOCK

## (undated) DEVICE — GLOVE SURG STRL SZ 6.5

## (undated) DEVICE — SEE L#152161

## (undated) DEVICE — DEVICE SUTURE CAPTURING 25CM

## (undated) DEVICE — SEE MEDLINE ITEM 154981

## (undated) DEVICE — SUT VICRYL 2 0 CT 2

## (undated) DEVICE — SEE MEDLINE ITEM 152739

## (undated) DEVICE — SUT VICRYL 0 CT-2 27 DYE

## (undated) DEVICE — SPONGE DERMACEA GAUZE 4X4

## (undated) DEVICE — CATH 16FR URETHRL RED RUB

## (undated) DEVICE — Device

## (undated) DEVICE — STRIP PK IODOFORM CURITY 2X5YD